# Patient Record
Sex: MALE | Race: WHITE | NOT HISPANIC OR LATINO | URBAN - NONMETROPOLITAN AREA
[De-identification: names, ages, dates, MRNs, and addresses within clinical notes are randomized per-mention and may not be internally consistent; named-entity substitution may affect disease eponyms.]

---

## 2017-01-20 ENCOUNTER — OFFICE VISIT (OUTPATIENT)
Dept: CARDIOLOGY | Facility: CLINIC | Age: 47
End: 2017-01-20

## 2017-01-20 VITALS
BODY MASS INDEX: 29.93 KG/M2 | HEIGHT: 72 IN | HEART RATE: 52 BPM | SYSTOLIC BLOOD PRESSURE: 120 MMHG | DIASTOLIC BLOOD PRESSURE: 90 MMHG | WEIGHT: 221 LBS

## 2017-01-20 DIAGNOSIS — E78.2 MIXED HYPERLIPIDEMIA: ICD-10-CM

## 2017-01-20 DIAGNOSIS — I25.10 CORONARY ARTERY DISEASE INVOLVING NATIVE CORONARY ARTERY OF NATIVE HEART WITHOUT ANGINA PECTORIS: Primary | ICD-10-CM

## 2017-01-20 DIAGNOSIS — Z87.891 EX-SMOKER: ICD-10-CM

## 2017-01-20 PROCEDURE — 99214 OFFICE O/P EST MOD 30 MIN: CPT | Performed by: INTERNAL MEDICINE

## 2017-01-20 PROCEDURE — 93000 ELECTROCARDIOGRAM COMPLETE: CPT | Performed by: INTERNAL MEDICINE

## 2017-01-20 RX ORDER — NITROGLYCERIN 0.3 MG/1
TABLET SUBLINGUAL
Qty: 100 TABLET | Refills: 11 | Status: SHIPPED | OUTPATIENT
Start: 2017-01-20

## 2017-01-20 RX ORDER — ATORVASTATIN CALCIUM 10 MG/1
10 TABLET, FILM COATED ORAL DAILY
Qty: 90 TABLET | Refills: 4 | Status: SHIPPED | OUTPATIENT
Start: 2017-01-20

## 2017-01-20 NOTE — MR AVS SNAPSHOT
Pio Gusman   1/20/2017 9:45 AM   Office Visit    Dept Phone:  [phone]   Encounter #:  [id number]    Provider:  Adam Jasso MD   Department:  [facility]                Your Full Care Plan              Today's Medication Changes          These changes are accurate as of: 1/20/17 10:56 AM.  If you have any questions, ask your nurse or doctor.               New Medication(s)Ordered:     nitroglycerin 0.3 MG SL tablet   Commonly known as:  NITROSTAT   1 under the tongue as needed for angina, may repeat q5mins for up three doses   Started by:  Adam Jasso MD            Where to Get Your Medications      These medications were sent to [facility]/pharmacy #[id number] - [place] - [address] - [phone]  - [phone]   [address] [place] [place] [address]     Phone:  [phone]     atorvastatin 10 MG tablet    nitroglycerin 0.3 MG SL tablet                  Your Updated Medication List          This list is accurate as of: 1/20/17 10:56 AM.  Always use your most recent med list.                aspirin 81 MG EC tablet       atorvastatin 10 MG tablet   Commonly known as:  LIPITOR   Take 1 tablet by mouth Daily.       LAXATIVE FORMULA PO       nitroglycerin 0.3 MG SL tablet   Commonly known as:  NITROSTAT   1 under the tongue as needed for angina, may repeat q5mins for up three doses               We Performed the Following     ECG 12 Lead       You Were Diagnosed With        Codes Comments    Coronary artery disease involving native coronary artery of native heart without angina pectoris    -  Primary ICD-10-CM: I25.10  ICD-9-CM: 414.01     Mixed hyperlipidemia     ICD-10-CM: E78.2  ICD-9-CM: 272.2     Ex-smoker     ICD-10-CM: Z87.891  ICD-9-CM: V15.82       Instructions     None    Patient Instructions History      Upcoming Appointments     Visit Type Date Time Department    FOLLOW UP 1/20/2017  9:45 AM [facility] PAD    FOLLOW UP 1/19/2018  8:00 AM [facility] HEART  "GROUP PAD      MyChart Signup     [facility] allows you to send messages to your doctor, view your test results, renew your prescriptions, schedule appointments, and more. To sign up, go to [employer] and click on the Sign Up Now link in the New User? box. Enter your [employer] Activation Code exactly as it appears below along with the last four digits of your Social Security Number and your Date of Birth () to complete the sign-up process. If you do not sign up before the expiration date, you must request a new code.    [employer] Activation Code: [email or web address]  Expires: 2/3/2017 10:55 AM    If you have questions, you can email [email or web address] or call [phone] to talk to our [employer] staff. Remember, [employer] is NOT to be used for urgent needs. For medical emergencies, dial 911.               Other Info from Your Visit           Your Appointments     2018  8:00 AM CST   Follow Up with Adam Jasso MD   [facility] (--)    [address] [place] [id number] [phone]           Arrive 15 minutes prior to appointment.              Allergies     No Known Allergies      Reason for Visit     Coronary Artery Disease 1 YR F/U. HAS BEEN DOING OK.      Vital Signs     Blood Pressure Pulse Height Weight Body Mass Index Smoking Status    120/90 52 72\" (182.9 cm) 221 lb (100 kg) 29.97 kg/m2 Former Smoker      Problems and Diagnoses Noted     Coronary artery disease involving native coronary artery of native heart without angina pectoris    Ex-smoker    Mixed hyperlipidemia      Results     ECG 12 Lead               "

## 2017-01-20 NOTE — PROGRESS NOTES
Pio Gusman  [id number]  1970  46 y.o.  male    Referring Provider: No Known Provider    Reason for Follow-up Visit: CAD    Overall doing well  Denies any chest pain or excessive shortness of breath  Compliant with medications    History of present illness:  Pio Gusman is a 46 y.o. yo male with history of CAD who presents today for   Chief Complaint   Patient presents with   • Coronary Artery Disease     1 YR F/U. HAS BEEN DOING OK.   .    History  Past Medical History   Diagnosis Date   • CAD (coronary artery disease)    • CAD in native artery    • Dyspnea    • Hyperlipidemia    • Myocardial infarction    • Old myocardial infarction    • SOB (shortness of breath)    • Stented coronary artery    ,   Past Surgical History   Procedure Laterality Date   • Cardiac catheterization     • Hand surgery Bilateral    • Coronary stent placement     ,   Family History   Problem Relation Age of Onset   • Coronary artery disease Father    • Heart attack Father    ,   Social History   Substance Use Topics   • Smoking status: Former Smoker   • Smokeless tobacco: None      Comment: quit after stent   • Alcohol use No   ,     Medications  Current Outpatient Prescriptions   Medication Sig Dispense Refill   • aspirin 81 MG EC tablet Take 81 mg by mouth Daily.     • atorvastatin (LIPITOR) 10 MG tablet Take 1 tablet by mouth Daily. 90 tablet 4   • Misc Natural Products (LAXATIVE FORMULA PO) Take  by mouth Daily.     • nitroglycerin (NITROSTAT) 0.3 MG SL tablet 1 under the tongue as needed for angina, may repeat q5mins for up three doses 100 tablet 11     No current facility-administered medications for this visit.        Allergies:  Review of patient's allergies indicates no known allergies.    Review of Systems  Review of Systems   Constitution: Negative.   HENT: Negative.    Eyes: Negative.    Cardiovascular: Negative for chest pain, claudication, cyanosis, dyspnea on exertion, irregular heartbeat, leg swelling, near-syncope,  "orthopnea, palpitations, paroxysmal nocturnal dyspnea and syncope.   Respiratory: Negative.    Endocrine: Negative.    Hematologic/Lymphatic: Negative.    Skin: Negative.    Gastrointestinal: Negative for anorexia.   Genitourinary: Negative.    Neurological: Negative.    Psychiatric/Behavioral: Negative.        Objective     Physical Exam:  Visit Vitals   • /90   • Pulse 52   • Ht 72\" (182.9 cm)   • Wt 221 lb (100 kg)   • BMI 29.97 kg/m2     Physical Exam   Constitutional: He appears well-developed.   HENT:   Head: Normocephalic.   Neck: Normal carotid pulses and no JVD present. No tracheal tenderness present. Carotid bruit is not present. No tracheal deviation and no edema present.   Cardiovascular: Regular rhythm, normal heart sounds and normal pulses.    Pulmonary/Chest: Effort normal. No stridor.   Abdominal: Soft.   Neurological: He is alert. He has normal strength. No cranial nerve deficit or sensory deficit.   Skin: Skin is warm.   Psychiatric: He has a normal mood and affect. His speech is normal and behavior is normal.       Results Review:       ECG 12 Lead  Date/Time: 1/20/2017 10:48 AM  Performed by: EVERETT GRIFFITHS  Authorized by: EVERETT GRIFFITHS   Comparison: compared with previous ECG from 8/19/2015  Similar to previous ECG  Rhythm: sinus bradycardia  Rate: bradycardic  Other findings: LVH            Assessment/Plan   Patient Active Problem List   Diagnosis   • Coronary artery disease involving native coronary artery of native heart without angina pectoris   • Mixed hyperlipidemia   • Ex-smoker        No palpitations. No significant pedal edema. Compliant with medications and diet. Latest labs and medications reviewed.    Plan:    Close follow up with you as scheduled.  Intensive factor modifications.  See order list.    Counseled regarding disease appropriate diet, fluid, caffeine, stimulants and sodium intake as well as importance of compliance to diet, exercise and regular follow up.  Cbc cmp " lipids    Return in about 1 year (around 1/20/2018).

## 2018-01-23 RX ORDER — ATORVASTATIN CALCIUM 10 MG/1
TABLET, FILM COATED ORAL
Qty: 90 TABLET | Refills: 3 | OUTPATIENT
Start: 2018-01-23

## 2020-04-15 ENCOUNTER — TELEPHONE (OUTPATIENT)
Dept: CARDIOLOGY | Facility: CLINIC | Age: 50
End: 2020-04-15

## 2020-04-15 NOTE — TELEPHONE ENCOUNTER
Fax: [phone] Devi    Patient is having rotator cuff repair on Friday and needing clearance to do so. Please advise.

## 2020-04-15 NOTE — TELEPHONE ENCOUNTER
We have not seen him in [facility] since January 2017  Is he followed anywhere else?  Has he had any recent testing or has he been seen by cardiology recently and another facility?

## 2020-04-15 NOTE — TELEPHONE ENCOUNTER
Devi with Dr. Grace's office is calling regarding a surgical clearance the patient states he had done in the fall of 2019.  He is having a procedure Friday and they are wanting to know about it.  Please call when available.

## 2020-04-16 NOTE — TELEPHONE ENCOUNTER
I cannot clear him without someone from cardiology seeing him, Dr Tapia may be able to clear him  He can get a virtual visit with one of the cardiology mid levels